# Patient Record
Sex: FEMALE | URBAN - METROPOLITAN AREA
[De-identification: names, ages, dates, MRNs, and addresses within clinical notes are randomized per-mention and may not be internally consistent; named-entity substitution may affect disease eponyms.]

---

## 2023-04-18 ENCOUNTER — APPOINTMENT (RX ONLY)
Dept: URBAN - METROPOLITAN AREA CLINIC 164 | Facility: CLINIC | Age: 58
Setting detail: DERMATOLOGY
End: 2023-04-18

## 2023-04-18 DIAGNOSIS — L98.1 FACTITIAL DERMATITIS: ICD-10-CM

## 2023-04-18 DIAGNOSIS — I89.0 LYMPHEDEMA, NOT ELSEWHERE CLASSIFIED: ICD-10-CM | Status: INADEQUATELY CONTROLLED

## 2023-04-18 PROCEDURE — ? PRESCRIPTION

## 2023-04-18 PROCEDURE — ? COUNSELING

## 2023-04-18 PROCEDURE — 99205 OFFICE O/P NEW HI 60 MIN: CPT

## 2023-04-18 PROCEDURE — ? ADDITIONAL NOTES

## 2023-04-18 PROCEDURE — ? DEFER

## 2023-04-18 RX ORDER — AMMONIUM LACTATE 12 %
LOTION (GRAM) TOPICAL
Qty: 227 | Refills: 11 | Status: ERX | COMMUNITY
Start: 2023-04-18

## 2023-04-18 RX ADMIN — Medication: at 00:00

## 2023-04-18 ASSESSMENT — LOCATION ZONE DERM: LOCATION ZONE: LEG

## 2023-04-18 ASSESSMENT — LOCATION DETAILED DESCRIPTION DERM
LOCATION DETAILED: RIGHT DISTAL PRETIBIAL REGION
LOCATION DETAILED: RIGHT PROXIMAL PRETIBIAL REGION
LOCATION DETAILED: LEFT DISTAL PRETIBIAL REGION
LOCATION DETAILED: LEFT PROXIMAL PRETIBIAL REGION

## 2023-04-18 ASSESSMENT — SEVERITY ASSESSMENT: SEVERITY: SEVERE

## 2023-04-18 ASSESSMENT — LOCATION SIMPLE DESCRIPTION DERM
LOCATION SIMPLE: RIGHT PRETIBIAL REGION
LOCATION SIMPLE: LEFT PRETIBIAL REGION

## 2023-04-18 ASSESSMENT — PAIN INTENSITY VAS: HOW INTENSE IS YOUR PAIN 0 BEING NO PAIN, 10 BEING THE MOST SEVERE PAIN POSSIBLE?: 8/10 PAIN

## 2023-04-18 NOTE — PROCEDURE: ADDITIONAL NOTES
Additional Notes: Patient had sepsis in September and was hospitalized.  Kidney function has returned.  Cirrhosis stage 3.   PCP prescribed an OTC anti fungal yesterday patient unsure of the name.  Dr. Juan Blas.  We need clearance from Dr PEREZ before doing any biopsy
Detail Level: Simple
Render Risk Assessment In Note?: yes
Patient Management Risk Assessment: High
Additional Notes: Patient presents with grossly swollen lower extremities/lymphedema. There is severe verrucous and papular skin changes patient states has been present and worsening for years. She is under the care of vascular and wound care. There is a chronic open wound of the right lower leg that is wrapped and not able to be examined today. She states that she was sent by the managing physician for the removal of two large polyps, one over each medial knee/pop fossa. On exam, patient lifts the pant legs to reveal a foul odor throughout the room. There is evidence of superficial skin infection. She states that she was just hospitalized and treated with antibiotics for cellulitis, and is being treated currently with a topical to address yeast at the popliteal fossa but is unable to recall the RX names. There is severe weeping of serous fluid from the lower legs generally that drips to the floor. \\n\\nI advised her that removal of these lesions is not medically necessary and can lead to infection, cellulitis, sepsis. I explained to the patient that removal will result in drainage of fluid for days and given her slow healing, will result in chronic open wounds. I advised that she return to her managing physician for guidance and management if they feel the lesions should be removed. \\n\\nPatient states that she showers/cleanses the skin on the lower legs 1-2 times per week. I advised her to consult with her PCP or managing physician on assistive devices for shower/bath so that she can cleanse with more frequency to improve hygiene of the legs. I recommended Hibiclens wash every other day with light exfoliation with clean cloth. Dry with clean towel. Apply ammonium lactate especially to extensor surfaces (knees, pretibs, calves).